# Patient Record
Sex: FEMALE | Race: WHITE | URBAN - METROPOLITAN AREA
[De-identification: names, ages, dates, MRNs, and addresses within clinical notes are randomized per-mention and may not be internally consistent; named-entity substitution may affect disease eponyms.]

---

## 2022-01-25 ENCOUNTER — PATIENT OUTREACH (OUTPATIENT)
Dept: EMERGENCY MEDICINE | Facility: HOSPITAL | Age: 22
End: 2022-01-25

## 2022-06-07 ENCOUNTER — PATIENT OUTREACH (OUTPATIENT)
Dept: EMERGENCY MEDICINE | Facility: HOSPITAL | Age: 22
End: 2022-06-07

## 2022-06-07 NOTE — PROGRESS NOTES
Spoke to pt for f/u call, doing well, no compliant. Stressed importance and benefits of pcp and all providers and ancillary care and to attend all scheduled appts. Pt reports her f/u pcp appt is 6/13/22. Discuss budget friendly healthy eating compliance. Pt denies si/hi and mental health crisis and ED utilization. Advised pt to utilize ucc for non emergency issues when pcp is not available. Pt voices understanding to instruction given and appreciation.     Appointments   Follow-Up Appt Scheduled :   Yes   Follow-Up Provider Appt Scheduled By :   Patient   PCP Name :   BRIANA JUAREZ NP WITH [facility]   Follow-Up Date :   6/13/22    Follow-Up Appointment Status :   Confirms scheduled appointment    PCP Visit Within Year :   Yes   PCP Visit Upcoming Reason :   Regular visit   PCP Visit One Year Date : 1/18/22   Follow-Up Specialist Appt Scheduled :   No   (Comment: PT REPORTS DOES NOT SEE ANY OTHER HEALTH CARE PROVIDER)

## 2022-06-10 ENCOUNTER — PATIENT OUTREACH (OUTPATIENT)
Dept: EMERGENCY MEDICINE | Facility: HOSPITAL | Age: 22
End: 2022-06-10

## 2022-06-10 NOTE — PROGRESS NOTES
Appointment Reminder:    Spoke to pt and reminder of appt on monday 6/13/22 at 9am with pcp BRIANA JUAREZ NP. Stressed importance of f/u care with all providers and ancillary care. Pt voices understanding to instructions given and appreciation.

## 2022-07-22 NOTE — PROGRESS NOTES
Original encounter date 1/25/22 in [employer] EMR.  Information placed in Epic for continuity purposes.               HealthE Care Entered On:  1/25/2022 16:19 CST    Performed On:  1/25/2022 16:06 CST by Rosette Arriola LPN               Discharge Past 30 Days   Visit to the Hospital in the Last 30 Days :   Emergency department (ED) visit   Reason for Choosing ED for Care :   Believes the problem too serious for doctor office or clinic   Perception of Change in Health Status DC :   Improving   Discharged To :   Home independently   DC Instructions :   Received discharge instructions , Understands discharge instructions    Education Provided :   ED utilization, Importance of keeping appointments, Community resources, Medication Compliance, Diet Compliance, Other: BENEFITS OF PCP AND ALL PROVIDERS AND ANCILLARY CARE   (Comment: BUDGET FRIENDLY HEALTHY EATING [Rosette Arriola LPN - 1/25/2022 16:06 CST] )   Discharge Past 30 Days Addntl Comments :   SPOKE TO PT FOR F/U ED VISIT, PT REPORTS SHE IS FEELING MUCH BETTER. ADVISED PT TO FOLLOW DISCHARGE INSTRUCTIONS AND TO CALL AND OBTAIN F/U APPT WITH PCP, PT REPORTS SHE HAS F/U APPT WITH PCP BRIANA JUAREZ NP ON 2/1/22 AND LAST VISIT WAS 1/18/22. DISCUSSED MEDICATION AND BUDGET HEALTH EATING COMPLIANCE. ADVISED PT TO VISIT [facility] FOR NON-EMERGENCY ISSUES WHEN PCP IS UNAVAILABLE. STRESSED IMPORTANCE OF F/U CARE WITH PCP AND ALL PROVIDERS AND ANCILLARY CARE. [facility] AND CONTINUE F/U CALLS AND REQUEST TO MAIL SDOH EDUCATION/RESOURCE DISCUSSED, VERIFIED PT ADDRESS WITH PT. PT VOICES UNDERSTANDING TO INSTRUCTIONS GIVEN AND APPRECIATION.     Rosette Arriola LPN - 1/25/2022 16:06 CST   Barriers to Care   SDoH Eat Less Than You Should :   No   SDoH Shut Off Services to Your Home :   No   SDoH No Regular Place to Live :   No   SDoH Needed Provider but Costs too Much :   No   SDoH Help Reading and Writing Paper Work :   No   SDoH Feel  Lonely Often :   No   SDoH Missed Appt/Meds- No Transportation :   No   SDoH Call Dr To Be Seen Right Away :   No   SDoH Medical Problems Cause ED Visit :   No   SDoH Other Problems Affecting Health :   No   (Comment: PT REPORTS HAS HX OF ANXIETY AND HAS NO ISSUES WITH ANXIETY, PT DENIES SI/HI AND MENTAL HEALTH CRISIS. PT REPORTS LAST SEEN FOR ANXIETY WAS AGE 16. ENCOURAGE PT TO VISIT ED FOR SI/HI AND MENTAL HEALTH CRISIS AND TO ALSO DISCUSS WITH PCP OF HX OF ANXIETY, PT VOICES UNDERSTANDING TO INSTRUCTIONS GIVEN AND APPRECIATION. [Rosette Arriola LPN - 1/25/2022 16:06 CST] )   SDoH Reviewed :   Yes   SDoH Dentist Seen in Last Year :   No   (Comment: PT REQUEST TO MAIL DENTAL PROVIDER OPTIONS TO HER [Rosette Arriola LPN - 1/25/2022 16:06 CST] )   Rosette Arriola LPN - 1/25/2022 16:06 CST   Prescriptions   Medication Reconcilation Completed :   Unknown   Medication Prescriptions Filled After DC :   Confirms all medications filled , Confirms taking medications as prescribed    Questions About Meds Prescribed at DC :   Denies any questions or concerns                    Rosette Arriola LPN - 1/25/2022 16:06 CST   Appointments   Follow-Up Appt Scheduled :   Yes   Follow-Up Provider Appt Scheduled By :   Patient   PCP Name :   BRIANA JUAREZ NP WITH [facility]   Follow-Up Date :   1/18/2022 CST   Follow-Up Appointment Status :   Confirms scheduled appointment    PCP Visit Within Year :   Yes   PCP Visit Upcoming Reason :   Regular visit   PCP Visit One Year Date :   2/1/2022 CST   Follow-Up Specialist Appt Scheduled :   No   (Comment: PT REPORTS DOES NOT SEE ANY OTHER HEALTH CARE PROVIDER [Rosette Arriola LPN - 1/25/2022 16:06 CST] )   Rosette Arriola LPN - 1/25/2022 16:06 CST   Navigation   Initial Assesment Completed By :   Phone   ED FIN :   [id number]   MCIP Navigation Call Log :   Initial MCIP contact   Referral To HE Care :   MCIP Navigation    Transportation Arrangements Made :   Yes   Providers Patient Visited Last Year :   BRIANA JUAREZ, BRIDGETT JOHNS III, NP   Root Causes for High-ED Utilization :   Lack of access to care   Plan: :   Educated on appropriate ED utilization, Educated on alternate means of health care; ie urgent care when PCP not available, Educated on importance of follow up care with PCP, Referred to community resources; ie [place], Educated on importance of follow up preventative dental care with dentist, Budget-friendly health eating education given, Other: PT REQUEST TO MAIL [facility] EDUCATION/RESOURCE TO PT   Participation in Activity Designed to Address Lack of Annual Ambulatory or Preventative Care Visit? :   Yes   Participation in Activity Designed to Address Avoidable ED Utilization? :   Yes   During Current Measurement Year, Did Enrollee Receive Education Regarding Outpatient Primary Care Options? :   Yes   During Current Measurement Year, Did Enrollee Receive an Appt Reminder 24-48 Hours Before a Scheduled Appt? :   Yes   During Current Measurement Year, Did the Network Provider Schedule and Appt or Provide a Referral to Enrollee? :   Yes   Education Provided :   Verbal, Written   Rosette Arriola LPN - 1/25/2022 16:06 CST     Result type: HealthE Care - Text  Result date: January 25, 2022 16:06 CST  Result status: Auth (Verified)  Result title: HealthE Care  Performed by: Rosette Arriola LPN on January 25, 2022 16:06 CST  Verified by: Rosette Arriola LPN on January 25, 2022 16:06 CST  Encounter info: [id number], [facility] COMMUNITY CARE MANAGEMENT, Utilization Coordination, 1/25/2022 -

## 2022-07-22 NOTE — PATIENT INSTRUCTIONS
* Final Report *    Education Note (Verified)  Patient Education Materials Follows:  Why Should I Have My Own Doctor or Nurse Practitioner (PCP) to Take Care of Me  What is a PCP (Primary Care Provider)?                    A primary care provider is a doctor or nurse practitioner who you can call for an appointment and will see you when you are sick.    You will also be seen at scheduled appointment times during the year to check on your diabetes, or high blood pressure, or heart disease.    Why see the same PCP (doctor/nurse practitioner)?  You can be seen faster when you are sick                                                                                                                                                                                                                                                                                                          You, the PCP (doctor/nurse practitioner) and the office staff get to know each other; you begin to trust them to care for you. You take part in your health choices.   All of you together are a team.    Your medicine is looked at every time you visit, to be sure you are taking the medicine, as the PCP (doctor/nurse practitioner) ordered.  Your PCP (doctor/nurse practitioner) and their staff help keep you healthy and out of the hospital.  They can catch sicknesses earlier by ordering tests once a year to stop or prevent the sickness from getting worse.                                                     Your PCP (doctor/nurse practitioner) can send you to providers who specialize (heart/bone/lung) if you need.  They and their office staff help keep track of your seeing other providers (doctors/nurse practitioners) and tests (CT/ MRIs/ X Rays)) taken.                                          PCPs want you to stay healthy.  Let us care for you.                                                         Why is taking care of your mouth/teeth/gums  important?                                                                             Your mouth is the opening to your body.  If not kept clean, it can let in sickness to the rest of your body.                                                 Oral Health Care (Dentists)  [facility], [facility].            [address] [place] [address], [phone]  [facility],             [address] [place] [address] [phone]  [facility]            [address] [place] [address], [phone]  [facility]            [address] [place] [address], [phone]  [facility]            [address] [place] [address] [phone]  [facility]            [address] [place] [address] [phone]  [facility], [address] [address] [phone]  [facility], [address] [facility] [address] [phone]  [facility]             [address] [place] [address] [phone]  [facility]; [place]: [phone]   \[facility] [place] [facility]"" [facility]; [place]: [phone]    [facility], [address] [facility] [address], [phone]  (Accepts [facility], HB and [employer])  JESS MODI DDS;[address] [place] [address]; [phone]  (ACCEPTS [facility], HB AND [employer])             Nutrition  Budget-Friendly Healthy Eating  There are many ways to save money at the grocery store and continue to eat healthy. You can be successful if you:   Plan meals according to your budget.   Make a grocery list and only purchase food according to your grocery list.   Prepare food yourself.  What are tips for following this plan?    Reading food labels   Compare food labels between brand name foods and the  "store brand. Often the nutritional value is the same, but the store brand is lower cost.   Look for products that do not have added sugar, fat, or salt (sodium). These often cost the same but are healthier for you. Products may be labeled as:  ? Sugar-free.  ? Nonfat.  ? Low-fat.  ? Sodium-free.  ? Low-sodium.   Look for lean ground beef labeled as at least 92% lean and 8% fat.  Shopping   Buy only the items on your grocery list and go only to the areas of the store that have the items on your list.   Use coupons only for foods and brands you normally buy. Avoid buying items you wouldn't normally buy simply because they are on sale.   Check online and in newspapers for weekly deals.   Buy healthy items from the bulk bins when available, such as herbs, spices, flour, pasta, nuts, and dried fruit.   Buy fruits and vegetables that are in season. Prices are usually lower on in-season produce.   Look at the unit price on the price tag. Use it to compare different brands and sizes to find out which item is the best deal.   Choose healthy items that are often low-cost, such as carrots, potatoes, apples, bananas, and oranges. Dried or canned beans are a low-cost protein source.   Buy in bulk and freeze extra food. Items you can buy in bulk include meats, fish, poultry, frozen fruits, and frozen vegetables.   Avoid buying "ready-to-eat" foods, such as pre-cut fruits and vegetables and pre-made salads.   If possible, shop around to discover where you can find the best prices. Consider other retailers such as dollar stores, larger wholesale stores, local fruit and vegetable [place], and [employer] markets.   Do not shop when you are hungry. If you shop while hungry, it may be hard to stick to your list and budget.   Resist impulse buying. Use your grocery list as your official plan for the week.   Buy a variety of vegetables and fruits by purchasing fresh, frozen, and canned items.   Look at the top and bottom shelves for deals. " "Foods at eye level (eye level of an adult or child) are usually more expensive.   Be efficient with your time when shopping. The more time you spend at the store, the more money you are likely to spend.   To save money when choosing more expensive foods like meats and dairy:  ? Choose cheaper cuts of meat, such as bone-in chicken thighs and drumsticks instead of skinless and boneless chicken. When you are ready to prepare the chicken, you can remove the skin yourself to make it healthier.  ? Choose lean meats like chicken or turkey instead of beef.  ? Choose canned seafood, such as tuna, salmon, or sardines.  ? Buy eggs as a low-cost source of protein.  ? Buy dried beans and peas, such as lentils, split peas, or kidney beans instead of meats. Dried beans and peas are a good alternative source of protein.  ? Buy the larger tubs of yogurt instead of individual-sized containers.   Choose water instead of sodas and other sweetened beverages.   Avoid buying chips, cookies, and other "junk food." These items are usually expensive and not healthy.  Cooking   Make extra food and freeze the extras in meal-sized containers or in individual portions for fast meals and snacks.   Pre-cook on days when you have extra time to prepare meals in advance. You can keep these meals in the fridge or freezer and reheat for a quick meal.   When you come home from the grocery store, wash, peel, and cut fruits and vegetables so they are ready to use and eat. This will help reduce food waste.  Meal planning   Do not eat out or get fast food. Prepare food at home.   Make a grocery list and make sure to bring it with you to the store. If you have a smart phone, you could use your phone to create your shopping list.   Plan meals and snacks according to a grocery list and budget you create.   Use leftovers in your meal plan for the week.   Look for recipes where you can cook once and make enough food for two meals.   Include budget-friendly meals " "like stews, casseroles, and stir-aguilera dishes.   Try some meatless meals or try "no cook" meals like salads.   Make sure that half your plate is filled with fruits or vegetables. Choose from fresh, frozen, or canned fruits and vegetables. If eating canned, remember to rinse them before eating. This will remove any excess salt added for packaging.  Summary   Eating healthy on a budget is possible if you plan your meals according to your budget, purchase according to your budget and grocery list, and prepare food yourself.   Tips for buying more food on a limited budget include buying generic brands, using coupons only for foods you normally buy, and buying healthy items from the bulk bins when available.   Tips for buying cheaper food to replace expensive food include choosing cheaper, lean cuts of meat, and buying dried beans and peas.  This information is not intended to replace advice given to you by your health care provider. Make sure you discuss any questions you have with your health care provider.  Document Released: 08/21/2015 Document Revised: 12/19/2018 Document Reviewed: 12/19/2018  [employer] Interactive Patient Education © 2019 [employer]    Pulmonary Medicine  Steps to Quit Smoking    Smoking tobacco can be harmful to your health and can affect almost every organ in your body. Smoking puts you, and those around you, at risk for developing many serious chronic diseases. Quitting smoking is difficult, but it is one of the best things that you can do for your health. It is never too late to quit.  What are the benefits of quitting smoking?  When you quit smoking, you lower your risk of developing serious diseases and conditions, such as:   Lung cancer or lung disease, such as COPD.   Heart disease.   Stroke.   Heart attack.   Infertility.   Osteoporosis and bone fractures.  Additionally, symptoms such as coughing, wheezing, and shortness of breath may get better when you quit. You may also find that you " get sick less often because your body is stronger at fighting off colds and infections. If you are pregnant, quitting smoking can help to reduce your chances of having a baby of low birth weight.  How do I get ready to quit?  When you decide to quit smoking, create a plan to make sure that you are successful. Before you quit:   Pick a date to quit. Set a date within the next two weeks to give you time to prepare.   Write down the reasons why you are quitting. Keep this list in places where you will see it often, such as on your bathroom mirror or in your car or wallet.   Identify the people, places, things, and activities that make you want to smoke (triggers) and avoid them. Make sure to take these actions:  ? Throw away all cigarettes at home, at work, and in your car.  ? Throw away smoking accessories, such as ashtrays and lighters.  ? Clean your car and make sure to empty the ashtray.  ? Clean your home, including curtains and carpets.   Tell your family, friends, and coworkers that you are quitting. Support from your loved ones can make quitting easier.   Talk with your health care provider about your options for quitting smoking.   Find out what treatment options are covered by your health insurance.  What strategies can I use to quit smoking?    Talk with your healthcare provider about different strategies to quit smoking. Some strategies include:   Quitting smoking altogether instead of gradually lessening how much you smoke over a period of time. Research shows that quitting cold turkey is more successful than gradually quitting.   Attending in-person counseling to help you build problem-solving skills. You are more likely to have success in quitting if you attend several counseling sessions. Even short sessions of 10 minutes can be effective.   Finding resources and support systems that can help you to quit smoking and remain smoke-free after you quit. These resources are most helpful when you use them  often. They can include:  ? Online chats with a counselor.  ? Telephone quitlines.  ? Printed self-help materials.  ? Support groups or group counseling.  ? Text messaging programs.  ? Mobile phone applications.   Taking medicines to help you quit smoking. (If you are pregnant or breastfeeding, talk with your health care provider first.) Some medicines contain nicotine and some do not. Both types of medicines help with cravings, but the medicines that include nicotine help to relieve withdrawal symptoms. Your health care provider may recommend:  ? Nicotine patches, gum, or lozenges.  ? Nicotine inhalers or sprays.  ? Non-nicotine medicine that is taken by mouth.  Talk with your health care provider about combining strategies, such as taking medicines while you are also receiving in-person counseling. Using these two strategies together makes you more likely to succeed in quitting than if you used either strategy on its own.  If you are pregnant or breastfeeding, talk with your health care provider about finding counseling or other support strategies to quit smoking. Do not take medicine to help you quit smoking unless told to do so by your health care provider.  What things can I do to make it easier to quit?    Quitting smoking might feel overwhelming at first, but there is a lot that you can do to make it easier. Take these important actions:   Reach out to your family and friends and ask that they support and encourage you during this time. Call telephone quitlines, reach out to support groups, or work with a counselor for support.   Ask people who smoke to avoid smoking around you.   Avoid places that trigger you to smoke, such as bars, parties, or smoke-break areas at work.   Spend time around people who do not smoke.   Lessen stress in your life, because stress can be a smoking trigger for some people. To lessen stress, try:  ? Exercising regularly.  ? Deep-breathing  exercises.  ? Yoga.  ? Meditating.  ? Performing a body scan. This involves closing your eyes, scanning your body from head to toe, and noticing which parts of your body are particularly tense. Purposefully relax the muscles in those areas.   Download or purchase mobile phone or tablet apps (applications) that can help you stick to your quit plan by providing reminders, tips, and encouragement. There are many free apps, such as QuitGuide from the CDC (Centers for Disease Control and Prevention). You can find other support for quitting smoking (smoking cessation) through smokefree.gov and other websites.  How will I feel when I quit smoking?  Within the first 24 hours of quitting smoking, you may start to feel some withdrawal symptoms. These symptoms are usually most noticeable 2-3 days after quitting, but they usually do not last beyond 2-3 weeks. Changes or symptoms that you might experience include:   Mood swings.   Restlessness, anxiety, or irritation.   Difficulty concentrating.   Dizziness.   Strong cravings for sugary foods in addition to nicotine.   Mild weight gain.   Constipation.   Nausea.   Coughing or a sore throat.   Changes in how your medicines work in your body.   A depressed mood.   Difficulty sleeping (insomnia).  After the first 2-3 weeks of quitting, you may start to notice more positive results, such as:   Improved sense of smell and taste.   Decreased coughing and sore throat.   Slower heart rate.   Lower blood pressure.   Clearer skin.   The ability to breathe more easily.   Fewer sick days.  Quitting smoking is very challenging for most people. Do not get discouraged if you are not successful the first time. Some people need to make many attempts to quit before they achieve long-term success. Do your best to stick to your quit plan, and talk with your health care provider if you have any questions or concerns.  This information is not intended to replace advice given to you by your health  care provider. Make sure you discuss any questions you have with your health care provider.  Document Released: 12/12/2002 Document Revised: 07/24/2018 Document Reviewed: 05/03/2016  [employer] Interactive Patient Education © 2019 [employer]        Result type: Education Note  Result date: January 25, 2022 16:05 CST  Result status: Auth (Verified)  Result title: Education Note  Performed by: Rosette Arriola LPN on January 25, 2022 16:05 CST  Verified by: Rosette Arriola LPN on January 25, 2022 16:05 CST  Encounter info: [id number], [facility] CARE MANAGEMENT, Utilization Coordination, 1/25/2022 -

## 2022-07-25 ENCOUNTER — PATIENT OUTREACH (OUTPATIENT)
Dept: EMERGENCY MEDICINE | Facility: HOSPITAL | Age: 22
End: 2022-07-25